# Patient Record
Sex: FEMALE | Race: WHITE
[De-identification: names, ages, dates, MRNs, and addresses within clinical notes are randomized per-mention and may not be internally consistent; named-entity substitution may affect disease eponyms.]

---

## 2021-06-24 ENCOUNTER — HOSPITAL ENCOUNTER (EMERGENCY)
Dept: HOSPITAL 8 - ED | Age: 41
Discharge: HOME | End: 2021-06-24
Payer: MEDICAID

## 2021-06-24 VITALS — DIASTOLIC BLOOD PRESSURE: 82 MMHG | SYSTOLIC BLOOD PRESSURE: 124 MMHG

## 2021-06-24 VITALS — BODY MASS INDEX: 18.4 KG/M2 | HEIGHT: 70 IN | WEIGHT: 128.53 LBS

## 2021-06-24 DIAGNOSIS — K64.4: Primary | ICD-10-CM

## 2021-06-24 PROCEDURE — 99283 EMERGENCY DEPT VISIT LOW MDM: CPT

## 2021-06-24 NOTE — NUR
INITIAL PT CONTACT. PT PRESENTS TO ED C/O RECTAL PAIN X1 DAY. "I THINK I HAVE 
HEMHORRHOIDS, I HAVEN'T HAD THEM BEFORE THOUGH." PT SITTING UPRIGHT ON LIYA ORTIZ VSS. PT DENIES ANY NEEDS AT THIS TIME. CALL LIGHT AND PERSONAL BELONGINGS 
WITHIN REACH. AWAITING ERP

## 2021-08-06 ENCOUNTER — HOSPITAL ENCOUNTER (EMERGENCY)
Dept: HOSPITAL 8 - ED | Age: 41
Discharge: HOME | End: 2021-08-06
Payer: MEDICAID

## 2021-08-06 VITALS — DIASTOLIC BLOOD PRESSURE: 64 MMHG | SYSTOLIC BLOOD PRESSURE: 130 MMHG

## 2021-08-06 VITALS — BODY MASS INDEX: 20.21 KG/M2 | HEIGHT: 62.5 IN | WEIGHT: 112.66 LBS

## 2021-08-06 DIAGNOSIS — F17.200: ICD-10-CM

## 2021-08-06 DIAGNOSIS — J45.909: Primary | ICD-10-CM

## 2021-08-06 PROCEDURE — 99283 EMERGENCY DEPT VISIT LOW MDM: CPT

## 2021-08-06 PROCEDURE — 93005 ELECTROCARDIOGRAM TRACING: CPT

## 2021-08-06 NOTE — NUR
PT STATES SHE CALLED INTO WORK AND NEEDS A WORK NOTE.

PT STATES WHEN SHE SPEAKS LOUDLY SHE GETS A COUGH

PT REPORTS STOMACH CRAMPS, AND NAUSEA EARLIER. NONE CURRENTLY.

PT STATES ITCHY, AND CP AND STATES SOMETHING IS GOING ON. 



ATTACHED TO MONITORS,VSS. NADN. 

BED IN LOW, RAILS ENGAGED. CALL LIGHT ON LAP.